# Patient Record
Sex: FEMALE | Race: WHITE
[De-identification: names, ages, dates, MRNs, and addresses within clinical notes are randomized per-mention and may not be internally consistent; named-entity substitution may affect disease eponyms.]

---

## 2017-12-19 ENCOUNTER — HOSPITAL ENCOUNTER (EMERGENCY)
Dept: HOSPITAL 63 - ER | Age: 52
LOS: 1 days | Discharge: HOME | End: 2017-12-20
Payer: SELF-PAY

## 2017-12-19 VITALS — WEIGHT: 165 LBS | BODY MASS INDEX: 32.39 KG/M2 | HEIGHT: 60 IN
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
## 2017-12-19 DIAGNOSIS — T78.49XA: Primary | ICD-10-CM

## 2017-12-19 PROCEDURE — 99284 EMERGENCY DEPT VISIT MOD MDM: CPT

## 2017-12-19 NOTE — PHYS DOC
Adult General


Chief Complaint


Chief Complaint:  INSECT BITE





HPI


HPI





Patient is a 4-year-old female who presents here today concerned that she might 

have an insect bites. Patient thought that she might have scabies flashlights 

that she has taken a shower with medication to treat lice. Patient reports that 

since has been noticing a rash on her skin and thinks that there are stuff 

crawling out of her skin. Patient has any other symptomology. Patient reports 

the rash itches. Patient has any fevers shakes chills nausea vomiting diarrhea 

chest pain shortness of breath. Patient reports that she slept a friend's house 

yesterday and used her blanket.








Review of systems:


Constitutional: Denies fever or chills 


Eyes: Denies change in visual acuity, redness, or eye pain 


HENT: Denies nasal congestion or sore throat 


Respiratory: Denies cough or shortness of breath 





All other systems were reviewed and found to be within normal limits, except as 

documented in this note.





Physical exam: 


Constitutional: Well developed, well nourished, no acute distress, non-toxic 

appearance. 


HENT: Normocephalic, atraumatic, bilateral external ears normal,  nose normal. 


Eyes: PERRLA, EOMI, conjunctiva normal, no discharge.  


Neck: Normal range of motion, no tenderness, supple, no stridor.  


Cardiovascular: Heart rate regular rhythm,


Lungs & Thorax:  Bilateral breath sounds clear to auscultation 


Abdomen: No abdominal distention.


Skin: Warm, dry, no erythema, rash noted diffusely throughout skin.


Description consistent with bedbugs. Distribution consistent with scabies. 

There are no bug swelling out of her skin the areas that she is putting out 

appear to be her own skin rashes tried up and flaking.


Back: Normal spinal curvature


Extremities: No tenderness, no cyanosis, no clubbing, ROM intact, no edema.  


Neurologic: Alert and oriented X 3, normal motor function, normal sensory 

function, no focal deficits noted. 


Psychologic: Affect normal, judgement normal, mood normal. 











Assessment and plan:


1.  Nonspecific rash of unclear etiology. Patient was prescribed prednisone and 

Benadryl. Patient instructed to follow-up with her doctor within the next 

several days if the rash is not resolved. This rash does not appear to be 

consistent with lupus, Campo-Pascual.





EKG


EKG


[]





Radiology/Procedures


Radiology/Procedures


[]





Course & Med Decision Making


Course & Med Decision Making


Pertinent Labs and Imaging studies reviewed. (See chart for details)





[]





Dragon Disclaimer


Dragon Disclaimer


This electronic medical record was generated, in whole or in part, using a 

voice recognition dictation system.





Departure


Departure:


Impression:  


 Primary Impression:  


 Skin rash


 Additional Impression:  


 Allergic reaction


Disposition:  01 HOME, SELF-CARE


Condition:  STABLE


Referrals:  


PCP,NO (PCP)


Patient Instructions:  Rash


Scripts


Prednisone (PREDNISONE) 50 Mg Tablet


1 TAB PO DAILY, #5 TAB


   Prov: JOVANA ACOSTA MD         12/19/17 


Diphenhydramine Hcl (BENADRYL) 25 Mg Capsule


2 CAP PO QHS, #20 CAP 2 Refills


   Prov: JOVANA ACOSTA MD         12/19/17





Problem Qualifiers











JOVANA ACOSTA MD Dec 19, 2017 23:54

## 2017-12-20 VITALS
SYSTOLIC BLOOD PRESSURE: 109 MMHG | DIASTOLIC BLOOD PRESSURE: 61 MMHG | SYSTOLIC BLOOD PRESSURE: 109 MMHG | DIASTOLIC BLOOD PRESSURE: 61 MMHG | DIASTOLIC BLOOD PRESSURE: 61 MMHG | SYSTOLIC BLOOD PRESSURE: 109 MMHG

## 2018-08-07 ENCOUNTER — HOSPITAL ENCOUNTER (OUTPATIENT)
Dept: HOSPITAL 63 - MAMMO | Age: 53
Discharge: HOME | End: 2018-08-07
Attending: PHYSICIAN ASSISTANT
Payer: COMMERCIAL

## 2018-08-07 DIAGNOSIS — Z12.31: Primary | ICD-10-CM

## 2018-08-07 PROCEDURE — 77067 SCR MAMMO BI INCL CAD: CPT

## 2018-08-22 ENCOUNTER — HOSPITAL ENCOUNTER (OUTPATIENT)
Dept: HOSPITAL 63 - PMG | Age: 53
Discharge: HOME | End: 2018-08-22
Attending: PHYSICIAN ASSISTANT
Payer: COMMERCIAL

## 2018-08-22 DIAGNOSIS — M25.512: Primary | ICD-10-CM

## 2018-08-22 PROCEDURE — 73030 X-RAY EXAM OF SHOULDER: CPT

## 2018-08-22 NOTE — RAD
EXAM: Left shoulder, 3 views.

 

HISTORY: Pain.

 

COMPARISON: None.

 

FINDINGS: 3 views of the left shoulder obtained. There is no fracture, 

dislocation or subluxation. There is slight calcification of the rotator 

cuff insertion along the greater tuberosity.

 

IMPRESSION: No acute osseous finding.

 

Electronically signed by: Janette Ying MD (8/22/2018 12:34 PM) [id number]

## 2020-01-03 ENCOUNTER — HOSPITAL ENCOUNTER (EMERGENCY)
Dept: HOSPITAL 63 - ER | Age: 55
Discharge: HOME | End: 2020-01-03
Payer: COMMERCIAL

## 2020-01-03 VITALS
DIASTOLIC BLOOD PRESSURE: 62 MMHG | SYSTOLIC BLOOD PRESSURE: 107 MMHG | DIASTOLIC BLOOD PRESSURE: 62 MMHG | SYSTOLIC BLOOD PRESSURE: 107 MMHG

## 2020-01-03 VITALS — BODY MASS INDEX: 32.46 KG/M2 | WEIGHT: 165.35 LBS | HEIGHT: 60 IN

## 2020-01-03 DIAGNOSIS — Z90.710: ICD-10-CM

## 2020-01-03 DIAGNOSIS — J45.909: ICD-10-CM

## 2020-01-03 DIAGNOSIS — Z98.51: ICD-10-CM

## 2020-01-03 DIAGNOSIS — Z88.7: ICD-10-CM

## 2020-01-03 DIAGNOSIS — M54.5: Primary | ICD-10-CM

## 2020-01-03 LAB
APTT PPP: YELLOW S
BACTERIA #/AREA URNS HPF: 0 /HPF
BILIRUB UR QL STRIP: (no result)
FIBRINOGEN PPP-MCNC: CLEAR MG/DL
GLUCOSE UR STRIP-MCNC: (no result) MG/DL
NITRITE UR QL STRIP: (no result)
RBC #/AREA URNS HPF: 0 /HPF (ref 0–2)
SP GR UR STRIP: 1.02
SQUAMOUS #/AREA URNS LPF: (no result) /LPF
UROBILINOGEN UR-MCNC: 0.2 MG/DL
WBC #/AREA URNS HPF: (no result) /HPF (ref 0–4)

## 2020-01-03 PROCEDURE — 99285 EMERGENCY DEPT VISIT HI MDM: CPT

## 2020-01-03 PROCEDURE — 81001 URINALYSIS AUTO W/SCOPE: CPT

## 2020-01-03 PROCEDURE — 72100 X-RAY EXAM L-S SPINE 2/3 VWS: CPT

## 2020-01-03 PROCEDURE — 96372 THER/PROPH/DIAG INJ SC/IM: CPT

## 2020-01-03 NOTE — PHYS DOC
Past History


Past Medical History:  Asthma


Past Surgical History:  Hysterectomy, Tubal ligation


Additional Past Surgical Histo:  cyst removed from rt wrist


Alcohol Use:  None


Drug Use:  Methamphetamine


Social History Narrative:  quit 8 yrs ago





Adult General


Chief Complaint


Chief Complaint:  BACK PAIN - NO INJURY





HPI


HPI





Patient is a 54-year-old female who presented to ER today for evaluation of low 

back pain started 3 days ago. Patient denies any injury, no bowel or bladder 

incontinence. Patient denies any urinary symptom, no frequency, no pain with 

urination. Patient denies any abdominal pain, no nausea vomiting. Patient says 

she got a spring  box mattress at [facility]  2 weeks ago and she started sleeping 

on it and then she started having low back pain since 3 days.  Pain get worse 

with any kind of movement or laying down flat on her back. No weakness or 

numbness in her lower extremity, no pain radiation to her lower extremity. She 

denies any fever.  She denies any history of low low back pain.





All other ROS is negative unless otherwise noted in HPI





Review of Systems


Review of Systems


See above





Allergies


Allergies





Allergies








Coded Allergies Type Severity Reaction Last Updated Verified


 


  Tetanus Vaccines and Toxoid Allergy Intermediate Swelling 1/3/20 Yes











Physical Exam


Physical Exam


See above


Constitutional: Well developed, well nourished, no acute distress, non-toxic 

appearance. []


HENT: Normocephalic, atraumatic, bilateral external ears normal, oropharynx 

moist, no oral exudates, nose normal. []


Eyes: PERRLA, EOMI, conjunctiva normal, no discharge. [] 


Neck: Normal range of motion, no tenderness, supple, no stridor. [] 


Cardiovascular:Heart rate regular rhythm, no murmur []


Lungs & Thorax:  Bilateral breath sounds clear to auscultation []


Abdomen: Bowel sounds normal, soft, no tenderness, no masses, no pulsatile 

masses. [] 


Skin: Warm, dry, no erythema, no rash. [] 


Back: there is muscle spasm and tenderness to palpation around L4/L5/S1 AREA.  

NO BONY STEP OFF.  NO RASH.  NO CVA TENDERNESS. 


Extremities: No tenderness, no cyanosis, no clubbing, ROM intact, no edema. [] 


Neurologic: Alert and oriented X 3, normal motor function, normal sensory 

function, no focal deficits noted. []


Psychologic: Affect normal, judgement normal, mood normal. []





Current Patient Data


Vital Signs





                                   Vital Signs








  Date Time  Temp Pulse Resp B/P (MAP) Pulse Ox O2 Delivery O2 Flow Rate FiO2


 


1/3/20 04:55 97.8 67 20 125/45 (71) 98 Room Air  











EKG


EKG


[]





Radiology/Procedures


Radiology/Procedures


[][facility] [address], [place] [address]


                                 [phone]


                                        


                                 IMAGING REPORT





                                     Signed





PATIENT: FRANDY STEPHENS    ACCOUNT: [id number]     MRN#: [id number]


: 1965           LOCATION: ER              AGE: 54


SEX: F                    EXAM DT: 20         ACCESSION#: [id number]


STATUS: REG ER            ORD. PHYSICIAN: JOSUE GALLARDO DO


REASON: Severe lower back pain today, no trauma


PROCEDURE: LUMBAR SPINE 2-3V





Study: LUMBAR SPINE 2-3V


 


Indication: Severe lower back pain. No known injury.


 


Comparison: None.


 


Findings:


 


Transitional lumbosacral anatomy and there are 4 standard lumbar-type 


vertebral bodies.


 


Straightening of lumbar lordosis. No significant disc space narrowing. 


Vertebral body height is maintained. Mild lower lumbar facet degeneration.


 


Potential tubal ligation clips.


 


Impression:


 


No acute fracture or advanced degenerative changes.


 


Electronically signed by: FAM HENRY MD (1/3/2020 5:51 AM) [facility]














DICTATED AND SIGNED BY:     FAM HENRY MD


DATE:     20 0551





CC: NAMEDENICE; JOSUE GALLARDO DO ~





Course & Med Decision Making


Course & Med Decision Making


Pertinent Labs and Imaging studies reviewed. (See chart for details)





[]





Dragon Disclaimer


Dragon Disclaimer


This electronic medical record was generated, in whole or in part, using a voice

 recognition dictation system.





Departure


Departure:


Impression:  


   Primary Impression:  


   Lower back pain


Disposition:   HOME, SELF-CARE


Condition:  STABLE


Referrals:  


NAMEDENICE (PCP)


please follow up with your doctor on Monday for reevaluation.


Patient Instructions:  Back Pain, Adult


Scripts


Cyclobenzaprine Hcl (CYCLOBENZAPRINE HCL) 10 Mg Tablet


1 TAB PO TID for muscle spasm, #21 TAB


   Prov: JOSUE GALLARDO DO         1/3/20 


Ibuprofen (Ibu) 800 Mg Tablet


1 TAB PO Q8HRS for back pain for 7 Days, #21 TAB 0 Refills


   Prov: JOSUE GALLARDO DO         1/3/20











JOSUE GALLARDO DO                 Desmond 3, 2020 05:25

## 2020-06-26 ENCOUNTER — HOSPITAL ENCOUNTER (OUTPATIENT)
Dept: HOSPITAL 63 - MAMMO | Age: 55
Discharge: HOME | End: 2020-06-26
Attending: FAMILY MEDICINE
Payer: COMMERCIAL

## 2020-06-26 DIAGNOSIS — R92.8: Primary | ICD-10-CM

## 2020-06-26 DIAGNOSIS — N63.20: ICD-10-CM

## 2020-06-26 PROCEDURE — 76641 ULTRASOUND BREAST COMPLETE: CPT

## 2020-06-26 PROCEDURE — 77066 DX MAMMO INCL CAD BI: CPT
